# Patient Record
Sex: MALE | Race: WHITE | NOT HISPANIC OR LATINO | URBAN - METROPOLITAN AREA
[De-identification: names, ages, dates, MRNs, and addresses within clinical notes are randomized per-mention and may not be internally consistent; named-entity substitution may affect disease eponyms.]

---

## 2018-06-28 ENCOUNTER — IMPORTED ENCOUNTER (OUTPATIENT)
Dept: URBAN - METROPOLITAN AREA CLINIC 50 | Facility: CLINIC | Age: 38
End: 2018-06-28

## 2018-11-20 ENCOUNTER — IMPORTED ENCOUNTER (OUTPATIENT)
Dept: URBAN - METROPOLITAN AREA CLINIC 50 | Facility: CLINIC | Age: 38
End: 2018-11-20

## 2018-11-20 NOTE — PATIENT DISCUSSION
"""Patient given finalized contact lens prescription.  Instructed to remove lenses and call [email or web address] office if ""

## 2021-04-17 ASSESSMENT — VISUAL ACUITY
OS_CC: 20/20
OD_CC: J1
OD_CC: 20/20
OD_CC: 20/20-1
OS_CC: 20/20-1
OS_CC: J1
OD_CC: J1+@ 16 IN
OS_CC: J1+@ 16 IN

## 2021-04-17 ASSESSMENT — TONOMETRY
OS_IOP_MMHG: 14
OD_IOP_MMHG: 14